# Patient Record
Sex: MALE
[De-identification: names, ages, dates, MRNs, and addresses within clinical notes are randomized per-mention and may not be internally consistent; named-entity substitution may affect disease eponyms.]

---

## 2018-11-16 PROBLEM — Z00.00 ENCOUNTER FOR PREVENTIVE HEALTH EXAMINATION: Status: ACTIVE | Noted: 2018-11-16

## 2018-11-23 ENCOUNTER — APPOINTMENT (OUTPATIENT)
Dept: ENDOCRINOLOGY | Facility: CLINIC | Age: 28
End: 2018-11-23
Payer: COMMERCIAL

## 2018-11-23 VITALS
SYSTOLIC BLOOD PRESSURE: 128 MMHG | HEIGHT: 65 IN | BODY MASS INDEX: 20.83 KG/M2 | HEART RATE: 67 BPM | DIASTOLIC BLOOD PRESSURE: 66 MMHG | WEIGHT: 125 LBS

## 2018-11-23 DIAGNOSIS — Z87.09 PERSONAL HISTORY OF OTHER DISEASES OF THE RESPIRATORY SYSTEM: ICD-10-CM

## 2018-11-23 PROCEDURE — 99205 OFFICE O/P NEW HI 60 MIN: CPT

## 2018-11-26 PROBLEM — Z87.09 HISTORY OF ASTHMA: Status: RESOLVED | Noted: 2018-11-26 | Resolved: 2018-11-26

## 2019-01-15 ENCOUNTER — APPOINTMENT (OUTPATIENT)
Dept: ENDOCRINOLOGY | Facility: CLINIC | Age: 29
End: 2019-01-15
Payer: COMMERCIAL

## 2019-01-15 VITALS
BODY MASS INDEX: 19.83 KG/M2 | SYSTOLIC BLOOD PRESSURE: 120 MMHG | HEIGHT: 65 IN | DIASTOLIC BLOOD PRESSURE: 73 MMHG | HEART RATE: 66 BPM | WEIGHT: 119 LBS

## 2019-01-15 PROCEDURE — 99213 OFFICE O/P EST LOW 20 MIN: CPT

## 2019-01-15 RX ORDER — LEVOTHYROXINE SODIUM 0.12 MG/1
125 TABLET ORAL
Refills: 0 | Status: DISCONTINUED | COMMUNITY

## 2019-03-15 ENCOUNTER — LABORATORY RESULT (OUTPATIENT)
Age: 29
End: 2019-03-15

## 2019-03-22 ENCOUNTER — RX RENEWAL (OUTPATIENT)
Age: 29
End: 2019-03-22

## 2019-03-22 RX ORDER — LEVOTHYROXINE SODIUM 0.09 MG/1
88 TABLET ORAL DAILY
Qty: 30 | Refills: 1 | Status: ACTIVE | COMMUNITY
Start: 2019-01-15 | End: 1900-01-01

## 2019-03-26 RX ORDER — LEVOTHYROXINE SODIUM 0.09 MG/1
88 TABLET ORAL DAILY
Qty: 30 | Refills: 4 | Status: ACTIVE | COMMUNITY
Start: 2019-03-26 | End: 1900-01-01

## 2019-03-26 NOTE — HISTORY OF PRESENT ILLNESS
[FreeTextEntry1] : Primary hypothyroidism, October 2018 TSH 63.2, likely autoimmune thyroid disease\par 28 years old M, with no significant past medical history\par Five siblings, none of them with diagnosis of endocrine autoimmune  disorder\par In March 2017 slight abnormal thyroid test that it normalized in 2018\par \par October 2018 he noticed tiredness ,went for physical and labs report TSH 63.2 with T4 free 0.6 and cholesterol 156. A1c 4.5 %\par C/o feeling very tired, weight gain\par Denies headache, col intolerance,GI disturbances, lightheadedness\par Meds : Levothyroxine 125 mcg\par January 15, 2019, patient came today for followup visit\par He's fairly well no complaints\par Patient is taken 125 mcg levothyroxine\par 12/11/18 thyroid u/s: diffusely heterogeneous hypervascular thyroid.\par 1/2/19 tsh 0.75, t4 free 2.1, thyroglobulin ab 14, TPO ab

## 2019-03-26 NOTE — ASSESSMENT
[Levothyroxine] : The patient was instructed to take Levothyroxine on an empty stomach, separate from vitamins, and wait at least 30 minutes before eating [FreeTextEntry1] : 28 years old male with no significant past medical history. No family history of thyroid altering and disorders\par Patient was recently diagnosed with primary hypothyroidism ( retrospectively no information of fever neither neck pain)\par patient is clinically euthyroid, recent TSH report:0.75 and T4 free 2.1 therefore will decrease levothyroxin to 88 mcg \par Return in 2 months\par \par Return in 8 weeks\par

## 2019-03-26 NOTE — PHYSICAL EXAM
[Alert] : alert [Normal Sclera/Conjunctiva] : normal sclera/conjunctiva [EOMI] : extra ocular movement intact [Normal Oropharynx] : the oropharynx was normal [Thyroid Not Enlarged] : the thyroid was not enlarged [No Thyroid Nodules] : there were no palpable thyroid nodules [No Respiratory Distress] : no respiratory distress [Normal Rate] : heart rate was normal  [Pedal Pulses Normal] : the pedal pulses are present [No Edema] : there was no peripheral edema [Normal Bowel Sounds] : normal bowel sounds [No Spinal Tenderness] : no spinal tenderness [Spine Straight] : spine straight [No Stigmata of Cushings Syndrome] : no stigmata of cushings syndrome [Normal Gait] : normal gait [Normal Strength/Tone] : muscle strength and tone were normal [No Rash] : no rash [Normal Reflexes] : deep tendon reflexes were 2+ and symmetric [Oriented x3] : oriented to person, place, and time [Acanthosis Nigricans] : no acanthosis nigricans [de-identified] : no hyperpigmentation

## 2019-05-14 ENCOUNTER — APPOINTMENT (OUTPATIENT)
Dept: ENDOCRINOLOGY | Facility: CLINIC | Age: 29
End: 2019-05-14
Payer: COMMERCIAL

## 2019-05-14 VITALS
BODY MASS INDEX: 19.8 KG/M2 | SYSTOLIC BLOOD PRESSURE: 116 MMHG | HEART RATE: 77 BPM | DIASTOLIC BLOOD PRESSURE: 69 MMHG | WEIGHT: 119 LBS

## 2019-05-14 DIAGNOSIS — E06.3 AUTOIMMUNE THYROIDITIS: ICD-10-CM

## 2019-05-14 PROCEDURE — 99213 OFFICE O/P EST LOW 20 MIN: CPT

## 2019-05-14 RX ORDER — LEVOTHYROXINE SODIUM 0.09 MG/1
88 TABLET ORAL DAILY
Qty: 90 | Refills: 3 | Status: ACTIVE | COMMUNITY
Start: 2019-05-14 | End: 1900-01-01

## 2019-05-17 PROBLEM — E06.3 HASHIMOTO'S DISEASE: Status: ACTIVE | Noted: 2018-11-23

## 2019-05-17 NOTE — END OF VISIT
[FreeTextEntry3] : All medical record entries made by the Scribe were at my, Dr. Valdez Pinto, direction and personally dictated by me on 05/14/2019. I have reviewed the chart and agree that the record accurately reflects my personal performance of the history, physical exam, assessment and plan. I have also personally directed, reviewed and agreed with the chart.\par

## 2019-05-17 NOTE — ADDENDUM
[FreeTextEntry1] : I, Rubiolachelle Choi, acted solely as a scribe for Dr. Valdez Pinto on this date. 05/14/2019.\par

## 2019-05-17 NOTE — HISTORY OF PRESENT ILLNESS
[FreeTextEntry1] : 10/ 2018 TSH 63.2, T4 free 0.6, cholesterol 156, A1c 4.5 %\par 12/11/18 Thyroid US: Impression: diffusely heterogenous hypervascular thyroid which can be seen in thyroiditis. \par 1/2/19 TSH 0.75, Free T4 2.1, thyroglobulin ab 14, s. creat 0.99, Free T3 3.5\par 3/15/19 TPO ab 127, thyroglobulin ab <20, Free T4 1.5, TSH 2.65\par \par 29 y/o M pt with Hx of primary hypothyroidism (10/2018 TSH 63.2, likely autoimmune thyroid disease).\par No FHx of endocrine autoimmune disorder\par In March 2017 slight abnormal thyroid test that it normalized in 2018\par \par Today pt presents with his pregnant wife for thyroid f/u, feeling well with no major physical complaints.\par \par Current Meds: Levothyroxine 88mcg on 1/2019 (previously 125mcg)

## 2019-05-17 NOTE — PHYSICAL EXAM
[Alert] : alert [Normal Sclera/Conjunctiva] : normal sclera/conjunctiva [Thyroid Not Enlarged] : the thyroid was not enlarged [No Thyroid Nodules] : there were no palpable thyroid nodules [No Respiratory Distress] : no respiratory distress [Normal Rate] : heart rate was normal  [Pedal Pulses Normal] : the pedal pulses are present [No Edema] : there was no peripheral edema [Normal Bowel Sounds] : normal bowel sounds [Spine Straight] : spine straight [No Stigmata of Cushings Syndrome] : no stigmata of cushings syndrome [Normal Gait] : normal gait [No Rash] : no rash [Normal Reflexes] : deep tendon reflexes were 2+ and symmetric [Oriented x3] : oriented to person, place, and time [Normal Outer Ear/Nose] : the ears and nose were normal in appearance [No Accessory Muscle Use] : no accessory muscle use [Clear to Auscultation] : lungs were clear to auscultation bilaterally [Acanthosis Nigricans] : no acanthosis nigricans [de-identified] : no hyperpigmentation

## 2019-05-17 NOTE — ASSESSMENT
[Levothyroxine] : The patient was instructed to take Levothyroxine on an empty stomach, separate from vitamins, and wait at least 30 minutes before eating [FreeTextEntry1] : 27 y/o M pt with:\par 1. Hx of Hashimoto thyroiditis:\par Pt is asymptomatic, and is excited because his wife is pregnant. \par Recommend pt continue levothyroxine 88 mcg. \par \par \par Return in 1 year.